# Patient Record
Sex: FEMALE | Race: WHITE | NOT HISPANIC OR LATINO | Employment: FULL TIME | URBAN - METROPOLITAN AREA
[De-identification: names, ages, dates, MRNs, and addresses within clinical notes are randomized per-mention and may not be internally consistent; named-entity substitution may affect disease eponyms.]

---

## 2020-09-08 ENCOUNTER — TELEPHONE (OUTPATIENT)
Dept: INTERNAL MEDICINE | Facility: CLINIC | Age: 55
End: 2020-09-08

## 2020-09-08 DIAGNOSIS — R35.0 URINE FREQUENCY: Primary | ICD-10-CM

## 2020-09-08 RX ORDER — SULFAMETHOXAZOLE AND TRIMETHOPRIM 800; 160 MG/1; MG/1
1 TABLET ORAL 2 TIMES DAILY
Qty: 6 TABLET | Refills: 0 | Status: SHIPPED | OUTPATIENT
Start: 2020-09-08 | End: 2020-09-11

## 2020-09-08 RX ORDER — SULFAMETHOXAZOLE AND TRIMETHOPRIM 800; 160 MG/1; MG/1
1 TABLET ORAL 2 TIMES DAILY
COMMUNITY
Start: 2020-09-08 | End: 2020-09-08 | Stop reason: SDUPTHER

## 2020-09-08 NOTE — TELEPHONE ENCOUNTER
PT CALLED C/O DK YELL, CLOUDY AND STRONG ODOR, AND FREQUENCY.  ASKING FOR RX TO BE SENT FOR UTI, SHE STATES SHE IS UNABLE TO BE SEEN BECAUSE SHE IS UNDER  SELF QUARANTINE DUE TO SURGICAL PROCEDURE BEING DONE THUR 9/10/20 (REMOVE MELANOMA FROM ARM) . SHE SAID SHE HAD UTI BEFORE AND THIS IS THE EXACT SYMPTOMS SHE HAD BEFORE WHEN YOU PRESCRIBED ABX .   I ADVISED HER YOU NORMALLY WOULD NOT DO THIS WITHOUT BEING SEEN OR AT LEAST DROPPING OFF SPECIMEN BUT SHE ASKED IF YOU MAKE EXCEPTION SINCE SHE IS UNABLE TO COME IN.  NKDA

## 2020-10-08 RX ORDER — GUAIFENESIN AND PSEUDOEPHEDRINE HYDROCHLORIDE 600; 60 MG/1; MG/1
TABLET, EXTENDED RELEASE ORAL
Qty: 30 TABLET | Refills: 1 | Status: SHIPPED | OUTPATIENT
Start: 2020-10-08 | End: 2020-12-03

## 2020-10-21 RX ORDER — LEVOTHYROXINE SODIUM 0.1 MG/1
TABLET ORAL
Qty: 30 TABLET | Refills: 1 | Status: SHIPPED | OUTPATIENT
Start: 2020-10-21 | End: 2020-12-28 | Stop reason: SDUPTHER

## 2020-12-03 RX ORDER — GUAIFENESIN AND PSEUDOEPHEDRINE HYDROCHLORIDE 600; 60 MG/1; MG/1
TABLET, EXTENDED RELEASE ORAL
Qty: 90 TABLET | Refills: 0 | Status: SHIPPED | OUTPATIENT
Start: 2020-12-03

## 2020-12-23 RX ORDER — LEVOTHYROXINE SODIUM 0.1 MG/1
TABLET ORAL
Qty: 30 TABLET | Refills: 0 | OUTPATIENT
Start: 2020-12-23

## 2020-12-28 RX ORDER — GUAIFENESIN, PSEUDOEPHEDRINE HYDROCHLORIDE 600; 60 MG/1; MG/1
1 TABLET, EXTENDED RELEASE ORAL DAILY
Qty: 90 TABLET | Refills: 0 | Status: CANCELLED | OUTPATIENT
Start: 2020-12-28

## 2020-12-28 RX ORDER — LEVOTHYROXINE SODIUM 0.1 MG/1
100 TABLET ORAL DAILY
Qty: 30 TABLET | Refills: 0 | Status: SHIPPED | OUTPATIENT
Start: 2020-12-28

## 2020-12-28 NOTE — TELEPHONE ENCOUNTER
Caller: Ingrid London    Relationship: Self    Best call back number:  Phone/Fax            12/28/2020 02:57 PM Phone (Incoming) Ingrid London (Self) [phone]          Medication needed:   Requested Prescriptions     Pending Prescriptions Disp Refills   • levothyroxine (SYNTHROID, LEVOTHROID) 100 MCG tablet 30 tablet 1     Sig: Take 1 tablet by mouth Daily.   • pseudoephedrine-guaifenesin (Mucinex D)  MG per 12 hr tablet 90 tablet 0     Sig: Take 1 tablet by mouth Daily.       When do you need the refill by: 01/01/21  What details did the patient provide when requesting the medication:  OFFBOARDING DR JUAN, NEW PATIENT [facility]      Does the patient have less than a 3 day supply:  [] Yes  [x] No    What is the patient's preferred pharmacy: [facility] [address]  AT [facility] [phone] [facility] [phone]

## 2024-03-28 ENCOUNTER — OFFICE VISIT (OUTPATIENT)
Dept: SLEEP MEDICINE | Facility: HOSPITAL | Age: 59
End: 2024-03-28
Payer: COMMERCIAL

## 2024-03-28 VITALS
BODY MASS INDEX: 40.84 KG/M2 | HEART RATE: 106 BPM | OXYGEN SATURATION: 96 % | WEIGHT: 239.2 LBS | SYSTOLIC BLOOD PRESSURE: 134 MMHG | HEIGHT: 64 IN | DIASTOLIC BLOOD PRESSURE: 75 MMHG

## 2024-03-28 DIAGNOSIS — G47.19 EXCESSIVE DAYTIME SLEEPINESS: ICD-10-CM

## 2024-03-28 DIAGNOSIS — R06.83 SNORING: ICD-10-CM

## 2024-03-28 DIAGNOSIS — E66.01 CLASS 3 SEVERE OBESITY DUE TO EXCESS CALORIES WITHOUT SERIOUS COMORBIDITY WITH BODY MASS INDEX (BMI) OF 40.0 TO 44.9 IN ADULT: ICD-10-CM

## 2024-03-28 DIAGNOSIS — F51.04 CHRONIC INSOMNIA: ICD-10-CM

## 2024-03-28 DIAGNOSIS — G47.30 OBSERVED SLEEP APNEA: Primary | ICD-10-CM

## 2024-03-28 DIAGNOSIS — G47.8 NON-RESTORATIVE SLEEP: ICD-10-CM

## 2024-03-28 PROBLEM — E66.813 CLASS 3 SEVERE OBESITY IN ADULT: Status: ACTIVE | Noted: 2024-03-28

## 2024-03-28 PROCEDURE — G0463 HOSPITAL OUTPT CLINIC VISIT: HCPCS

## 2024-03-28 PROCEDURE — 99204 OFFICE O/P NEW MOD 45 MIN: CPT | Performed by: INTERNAL MEDICINE

## 2024-03-28 NOTE — PROGRESS NOTES
[facility]  [address]  [address]  [place] [address]  Phone   Fax       Ingrid London  [id number]   1965  58 y.o.  female      PCP:Provider, No Known    Type of service: Initial New Patient Office Visit  Date of service: 3/28/2024    Chief Complaint   Patient presents with    Witnessed Apnea    Snoring    Non-restorative Sleep    Fatigue    Dry Mouth    Daytime Sleepiness    Insomnia    Obesity       History of present illness;  Ingrid London 58 y.o.  is a new patient for me and was seen today for sleep related problems of snoring, non-restorative sleep and witnessed apneas. The symptoms are present for many years and they are persistent in nature.  The snoring is present in all positions and it is loud.  Patient has no prior surgery namely tonsillectomy, nasal surgery and UPPP.  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  She used to be in the bed all area now she has more to longterm plans.  She feels tired exhausted and not rested when she wakes up.    In addition she has chronic insomnia for which she is taking 300 mg of trazodone provided by her psychiatrist and also as needed Ambien 5 mg maybe 1 or 2 times a week.      Patient gives the following sleep history.  Sleep schedule:  Bedtime: 9 PM but usually takes 2 to 3 hours to fall asleep  Wake time: 7 AM  Normally takes about 2 to 3 hours to fall asleep unfortunately she watches television during that time  Average hours of sleep 6  Number of naps per day none  Symptoms  In addition to snoring, nonrestorative sleep and witnessed apneas patient gives the following associated symptoms.  Have you ever awakened gasping for breath, coughing, choking: Yes   Change in weight,  Yes gained 10 pounds  Morning headaches  Yes   Awaken with a sore throat or dry mouth  No   Leg jerking at night:  No   Crawly feeling/urge sensation to move in the legs: No   Teeth grinding:No   Have you ever awakened at night with a sour taste or  "burning sensation in your chest:  No   Do you have muscle weakness with laughing or anger or sleep paralysis:  No   Have you ever felt paralyzed while going to sleep or waking up:  No   Sleepwalking, nightmares, No   Nocturia (urination at night): 2 times per night  Memory Problem:No     Past medical history: (Relevant to sleep medicine)  Anxiety and depression  Migraine  Insomnia  Hypothyroidism    Medications are reviewed by me and documented in the encounter  Allergies reviewed and documented in encounter    Social history:  Do you drive a commercial vehicle:  No   Shift work:  No   Tobacco use:  No   Alcohol use:  1 per month  Caffeinated drinks: 2    FAMILY HISTORY (Your mother, father, brothers and sisters) (relevant to sleep medicine)  Thyroid disorder  Obesity    REVIEW OF SYSTEMS.  Full review of systems available on the intake form which is scanned in the media tab.  The relevant positive are noted below  Daytime excessive sleepiness with [place] Sleepiness Scale :Total score: 10   Snoring  Frequent urination  Heartburn  Postnasal drip      Physical exam:  Vitals:    03/28/24 1100   BP: 134/75   Pulse: 106   SpO2: 96%   Weight: 109 kg (239 lb 3.2 oz)   Height: 162.6 cm (64\")    Body mass index is 41.06 kg/m². Neck Circumference: 14.5 inches  Nose: no nasal septal defects or deviation and the nasal passages are clear, no nasal polyps,  Throat: tonsils are at enlarged, tongue normal, oral airway Mallampati class 3  NECK:Neck Circumference: 14.5 inches, trachea is in the midline, thyroid not enlarged  RESPIRATORY SYSTEM: Breath sounds are equal on both sides, there are no wheezes   CARDIOVASULAR SYSTEM: Heart sounds are regular rhythm and liliana rate, no edema  EXTREMITES: No cyanosis, clubbing  NEUROLOGICAL SYSTEM: Oriented x 3, no gross motor defects, gait normal        Assessment and plan:  Witnessed apnea (R06.81) patient's symptoms and examination is consistent with sleep apnea (G47.30)  I have talked to " the patient about the signs and symptoms of sleep apnea. In addition, I have also discussed pathophysiology of sleep apnea.  I also discussed the complications of untreated sleep apnea including effects on hypertension, diabetes mellitus and nonrestorative sleep with hypersomnia which can increase risk for motor vehicle accidents.  Untreated sleep apnea is also a risk factor for development of atrial fibrillation, pulmonary hypertension, insulin resistance and stroke.  Discussed in detail of various testing methods including home-based and lab based sleep studies.  Based on history and physical examination the most appropriate study is home sleep test.  The order for the sleep study is placed in [facility].  The test will be scheduled after approval from insurance. Treatment and management will be discussed after the test is completed.  Patient was given opportunity to ask questions and all the questions were answered.   Snoring (R06.83) snoring is the sound created by turbulent airflow vibrating upper airway soft tissue.  I have also discussed factors affecting snoring including sleep deprivation, sleeping on the back and alcohol ingestion. To minimize snoring, patient is advised to have adequate sleep, sleep on the side and avoid alcohol and sedative medications before bedtime  Daytime excessive sleepiness .  It was assessed with [place] Sleepiness Scale of Total score: 10.  There are many causes for daytime excessive sleepiness including sleep depression, shiftwork syndrome, depression and other medical disorders including heart, kidney and liver failure.  The most serious cause of excessive sleepiness is due to neurological conditions like narcolepsy/cataplexy.  But the most common cause of excessive sleepiness is due to sleep apnea with frequent awakenings during sleep time.  I have discussed safety of driving and to remain vigilant while driving.  Obesity 3, with BMI Body mass index is 41.06 kg/m².. I have discussed  the relationship between weight and sleep apnea.There is direct correlation between weight and severity of sleep apnea.  Weight reduction is encouraged, as it is going to reduce the severity of sleep apnea. I have also discussed with the patient diet and exercise to achieve ideal body weight  Chronic insomnia, patient needs to wean off trazodone.  Trazodone has shown no benefit and insomnia.  Current AASM guidelines does not recommend trazodone.  Meantime she can use Ambien only on a short-term basis but she is going to start practicing CBT-I (cognitive behavoral therapy).  I have also given instructions for stimulus control.  She can download thania an good sleep hygiene along with stimulus control.  She is spending too much time in bed.    I have also discussed with the patient the following  Sleep hygiene: Maintaining a regular bedtime and wake time, not to watch television or work in bed, limit caffeine-containing beverages before bed time and avoid naps during the day  Adequate amount of sleep.  Generally most people needs about 7 to 8 hours of sleep.  Return for 31 to 90 days after PAP setup with down load..  Patient's questions were answered.      3/28/2024  Cecilia Taylor MD  Sleep Medicine  Medical Director  [facility]: [place] and [place] sleep centers

## 2025-03-28 ENCOUNTER — OFFICE (AMBULATORY)
Dept: URBAN - METROPOLITAN AREA PATHOLOGY 4 | Facility: PATHOLOGY | Age: 60
End: 2025-03-28
Payer: COMMERCIAL

## 2025-03-28 ENCOUNTER — AMBULATORY SURGICAL CENTER (AMBULATORY)
Dept: URBAN - METROPOLITAN AREA SURGERY 17 | Facility: SURGERY | Age: 60
End: 2025-03-28
Payer: COMMERCIAL

## 2025-03-28 VITALS
SYSTOLIC BLOOD PRESSURE: 127 MMHG | TEMPERATURE: 97.7 F | DIASTOLIC BLOOD PRESSURE: 92 MMHG | HEART RATE: 86 BPM | RESPIRATION RATE: 24 BRPM | OXYGEN SATURATION: 94 % | SYSTOLIC BLOOD PRESSURE: 125 MMHG | DIASTOLIC BLOOD PRESSURE: 107 MMHG | RESPIRATION RATE: 11 BRPM | SYSTOLIC BLOOD PRESSURE: 132 MMHG | RESPIRATION RATE: 10 BRPM | SYSTOLIC BLOOD PRESSURE: 117 MMHG | RESPIRATION RATE: 16 BRPM | WEIGHT: 170 LBS | SYSTOLIC BLOOD PRESSURE: 144 MMHG | HEART RATE: 84 BPM | DIASTOLIC BLOOD PRESSURE: 69 MMHG | SYSTOLIC BLOOD PRESSURE: 116 MMHG | OXYGEN SATURATION: 91 % | HEART RATE: 102 BPM | RESPIRATION RATE: 18 BRPM | SYSTOLIC BLOOD PRESSURE: 122 MMHG | OXYGEN SATURATION: 95 % | DIASTOLIC BLOOD PRESSURE: 73 MMHG | SYSTOLIC BLOOD PRESSURE: 110 MMHG | DIASTOLIC BLOOD PRESSURE: 71 MMHG | TEMPERATURE: 97.2 F | HEART RATE: 82 BPM | SYSTOLIC BLOOD PRESSURE: 129 MMHG | SYSTOLIC BLOOD PRESSURE: 111 MMHG | HEART RATE: 92 BPM | RESPIRATION RATE: 19 BRPM | HEIGHT: 64 IN | OXYGEN SATURATION: 97 % | SYSTOLIC BLOOD PRESSURE: 121 MMHG | OXYGEN SATURATION: 98 % | DIASTOLIC BLOOD PRESSURE: 68 MMHG | DIASTOLIC BLOOD PRESSURE: 76 MMHG | DIASTOLIC BLOOD PRESSURE: 75 MMHG | SYSTOLIC BLOOD PRESSURE: 118 MMHG | RESPIRATION RATE: 20 BRPM | RESPIRATION RATE: 22 BRPM | HEART RATE: 85 BPM | DIASTOLIC BLOOD PRESSURE: 83 MMHG | DIASTOLIC BLOOD PRESSURE: 77 MMHG | SYSTOLIC BLOOD PRESSURE: 112 MMHG | DIASTOLIC BLOOD PRESSURE: 88 MMHG | HEART RATE: 83 BPM | RESPIRATION RATE: 21 BRPM

## 2025-03-28 DIAGNOSIS — D12.3 BENIGN NEOPLASM OF TRANSVERSE COLON: ICD-10-CM

## 2025-03-28 DIAGNOSIS — K57.10 DIVERTICULOSIS OF SMALL INTESTINE WITHOUT PERFORATION OR ABS: ICD-10-CM

## 2025-03-28 DIAGNOSIS — D12.0 BENIGN NEOPLASM OF CECUM: ICD-10-CM

## 2025-03-28 DIAGNOSIS — K31.89 OTHER DISEASES OF STOMACH AND DUODENUM: ICD-10-CM

## 2025-03-28 DIAGNOSIS — D12.2 BENIGN NEOPLASM OF ASCENDING COLON: ICD-10-CM

## 2025-03-28 DIAGNOSIS — K22.70 BARRETT'S ESOPHAGUS WITHOUT DYSPLASIA: ICD-10-CM

## 2025-03-28 DIAGNOSIS — K57.30 DIVERTICULOSIS OF LARGE INTESTINE WITHOUT PERFORATION OR ABS: ICD-10-CM

## 2025-03-28 DIAGNOSIS — R12 HEARTBURN: ICD-10-CM

## 2025-03-28 DIAGNOSIS — Z12.11 ENCOUNTER FOR SCREENING FOR MALIGNANT NEOPLASM OF COLON: ICD-10-CM

## 2025-03-28 DIAGNOSIS — Z80.0 FAMILY HISTORY OF MALIGNANT NEOPLASM OF DIGESTIVE ORGANS: ICD-10-CM

## 2025-03-28 DIAGNOSIS — K29.71 GASTRITIS, UNSPECIFIED, WITH BLEEDING: ICD-10-CM

## 2025-03-28 PROBLEM — K63.5 POLYP OF COLON: Status: ACTIVE | Noted: 2025-03-28

## 2025-03-28 PROBLEM — K22.89 OTHER SPECIFIED DISEASE OF ESOPHAGUS: Status: ACTIVE | Noted: 2025-03-28

## 2025-03-28 LAB
GI HISTOLOGY: A. STOMACH ANTRUM: (no result)
GI HISTOLOGY: B. GASTRO-ESOPHAGEAL JUNCTION: (no result)
GI HISTOLOGY: D. HEPATIC FLEXURE/POLYP: (no result)
GI HISTOLOGY: PDF REPORT: (no result)
Lab: (no result)

## 2025-03-28 PROCEDURE — 43239 EGD BIOPSY SINGLE/MULTIPLE: CPT | Performed by: INTERNAL MEDICINE

## 2025-03-28 PROCEDURE — 88342 IMHCHEM/IMCYTCHM 1ST ANTB: CPT | Performed by: PATHOLOGY

## 2025-03-28 PROCEDURE — 45380 COLONOSCOPY AND BIOPSY: CPT | Mod: 33,59 | Performed by: INTERNAL MEDICINE

## 2025-03-28 PROCEDURE — 88305 TISSUE EXAM BY PATHOLOGIST: CPT | Performed by: PATHOLOGY

## 2025-03-28 PROCEDURE — 45385 COLONOSCOPY W/LESION REMOVAL: CPT | Mod: 33 | Performed by: INTERNAL MEDICINE

## 2025-05-21 ENCOUNTER — LAB (OUTPATIENT)
Dept: OTHER | Facility: HOSPITAL | Age: 60
End: 2025-05-21
Payer: COMMERCIAL

## 2025-05-21 ENCOUNTER — CONSULT (OUTPATIENT)
Dept: ONCOLOGY | Facility: CLINIC | Age: 60
End: 2025-05-21
Payer: COMMERCIAL

## 2025-05-21 VITALS
TEMPERATURE: 97.5 F | RESPIRATION RATE: 17 BRPM | BODY MASS INDEX: 29.52 KG/M2 | WEIGHT: 166.6 LBS | DIASTOLIC BLOOD PRESSURE: 78 MMHG | OXYGEN SATURATION: 97 % | SYSTOLIC BLOOD PRESSURE: 114 MMHG | HEIGHT: 63 IN | HEART RATE: 82 BPM

## 2025-05-21 DIAGNOSIS — Z85.820 HISTORY OF MELANOMA: ICD-10-CM

## 2025-05-21 DIAGNOSIS — D75.89 MACROCYTOSIS: ICD-10-CM

## 2025-05-21 DIAGNOSIS — D72.820 LYMPHOCYTOSIS: ICD-10-CM

## 2025-05-21 DIAGNOSIS — D72.820 LYMPHOCYTOSIS: Primary | ICD-10-CM

## 2025-05-21 DIAGNOSIS — R89.9 ABNORMAL LABORATORY TEST: Primary | ICD-10-CM

## 2025-05-21 DIAGNOSIS — R11.0 INTRACTABLE NAUSEA: ICD-10-CM

## 2025-05-21 DIAGNOSIS — R53.82 CHRONIC FATIGUE: ICD-10-CM

## 2025-05-21 LAB
ALBUMIN SERPL-MCNC: 4.4 G/DL (ref 3.5–5.2)
ALBUMIN/GLOB SERPL: 2 G/DL
ALP SERPL-CCNC: 75 U/L (ref 39–117)
ALT SERPL W P-5'-P-CCNC: 8 U/L (ref 1–33)
ANION GAP SERPL CALCULATED.3IONS-SCNC: 11.9 MMOL/L (ref 5–15)
AST SERPL-CCNC: 15 U/L (ref 1–32)
B2 MICROGLOB SERPL-MCNC: 2.9 MG/L (ref 0.8–2.2)
BASOPHILS # BLD AUTO: 0.06 10*3/MM3 (ref 0–0.2)
BASOPHILS NFR BLD AUTO: 0.6 % (ref 0–1.5)
BILIRUB SERPL-MCNC: 0.4 MG/DL (ref 0–1.2)
BUN SERPL-MCNC: 6 MG/DL (ref 8–23)
BUN/CREAT SERPL: 5.7 (ref 7–25)
CALCIUM SPEC-SCNC: 9.7 MG/DL (ref 8.6–10.5)
CHLORIDE SERPL-SCNC: 102 MMOL/L (ref 98–107)
CO2 SERPL-SCNC: 25.1 MMOL/L (ref 22–29)
CREAT SERPL-MCNC: 1.06 MG/DL (ref 0.57–1)
DEPRECATED RDW RBC AUTO: 45.7 FL (ref 37–54)
EGFRCR SERPLBLD CKD-EPI 2021: 59.9 ML/MIN/1.73
EOSINOPHIL # BLD AUTO: 0.07 10*3/MM3 (ref 0–0.4)
EOSINOPHIL NFR BLD AUTO: 0.7 % (ref 0.3–6.2)
ERYTHROCYTE [DISTWIDTH] IN BLOOD BY AUTOMATED COUNT: 12.5 % (ref 12.3–15.4)
FOLATE SERPL-MCNC: 5.92 NG/ML (ref 4.78–24.2)
GLOBULIN UR ELPH-MCNC: 2.2 GM/DL
GLUCOSE SERPL-MCNC: 89 MG/DL (ref 65–99)
HCT VFR BLD AUTO: 46.5 % (ref 34–46.6)
HGB BLD-MCNC: 15.4 G/DL (ref 12–15.9)
IMM GRANULOCYTES # BLD AUTO: 0.05 10*3/MM3 (ref 0–0.05)
IMM GRANULOCYTES NFR BLD AUTO: 0.5 % (ref 0–0.5)
LDH SERPL-CCNC: 133 U/L (ref 135–214)
LYMPHOCYTES # BLD AUTO: 1.62 10*3/MM3 (ref 0.7–3.1)
LYMPHOCYTES NFR BLD AUTO: 16.9 % (ref 19.6–45.3)
MCH RBC QN AUTO: 33.1 PG (ref 26.6–33)
MCHC RBC AUTO-ENTMCNC: 33.1 G/DL (ref 31.5–35.7)
MCV RBC AUTO: 100 FL (ref 79–97)
MONOCYTES # BLD AUTO: 0.62 10*3/MM3 (ref 0.1–0.9)
MONOCYTES NFR BLD AUTO: 6.5 % (ref 5–12)
NEUTROPHILS NFR BLD AUTO: 7.14 10*3/MM3 (ref 1.7–7)
NEUTROPHILS NFR BLD AUTO: 74.8 % (ref 42.7–76)
NRBC BLD AUTO-RTO: 0 /100 WBC (ref 0–0.2)
PLATELET # BLD AUTO: 404 10*3/MM3 (ref 140–450)
PMV BLD AUTO: 8.4 FL (ref 6–12)
POTASSIUM SERPL-SCNC: 4 MMOL/L (ref 3.5–5.2)
PROT SERPL-MCNC: 6.6 G/DL (ref 6–8.5)
RBC # BLD AUTO: 4.65 10*6/MM3 (ref 3.77–5.28)
SODIUM SERPL-SCNC: 139 MMOL/L (ref 136–145)
VIT B12 BLD-MCNC: >2000 PG/ML (ref 211–946)
WBC NRBC COR # BLD AUTO: 9.56 10*3/MM3 (ref 3.4–10.8)

## 2025-05-21 PROCEDURE — 85025 COMPLETE CBC W/AUTO DIFF WBC: CPT | Performed by: INTERNAL MEDICINE

## 2025-05-21 PROCEDURE — 82232 ASSAY OF BETA-2 PROTEIN: CPT | Performed by: INTERNAL MEDICINE

## 2025-05-21 PROCEDURE — 36415 COLL VENOUS BLD VENIPUNCTURE: CPT

## 2025-05-21 PROCEDURE — 83521 IG LIGHT CHAINS FREE EACH: CPT | Performed by: INTERNAL MEDICINE

## 2025-05-21 PROCEDURE — 80053 COMPREHEN METABOLIC PANEL: CPT | Performed by: INTERNAL MEDICINE

## 2025-05-21 PROCEDURE — 84165 PROTEIN E-PHORESIS SERUM: CPT | Performed by: INTERNAL MEDICINE

## 2025-05-21 PROCEDURE — 83921 ORGANIC ACID SINGLE QUANT: CPT | Performed by: INTERNAL MEDICINE

## 2025-05-21 PROCEDURE — 82784 ASSAY IGA/IGD/IGG/IGM EACH: CPT | Performed by: INTERNAL MEDICINE

## 2025-05-21 PROCEDURE — 83615 LACTATE (LD) (LDH) ENZYME: CPT | Performed by: INTERNAL MEDICINE

## 2025-05-21 PROCEDURE — 86334 IMMUNOFIX E-PHORESIS SERUM: CPT | Performed by: INTERNAL MEDICINE

## 2025-05-21 PROCEDURE — 82746 ASSAY OF FOLIC ACID SERUM: CPT | Performed by: INTERNAL MEDICINE

## 2025-05-21 PROCEDURE — 82607 VITAMIN B-12: CPT | Performed by: INTERNAL MEDICINE

## 2025-05-21 RX ORDER — ERENUMAB-AOOE 140 MG/ML
140 INJECTION, SOLUTION SUBCUTANEOUS
COMMUNITY
Start: 2024-08-26 | End: 2025-08-26

## 2025-05-21 RX ORDER — GALCANEZUMAB 120 MG/ML
120 INJECTION, SOLUTION SUBCUTANEOUS ONCE
COMMUNITY

## 2025-05-21 RX ORDER — TRAZODONE HYDROCHLORIDE 50 MG/1
50 TABLET ORAL NIGHTLY
COMMUNITY
Start: 2020-05-20

## 2025-05-21 NOTE — LETTER
May 21, 2025     Linda Myers MD  [address] [phone]    Patient: Ingrid London   YOB: 1965   Date of Visit: 5/21/2025     Dear Linda Myers MD:       Thank you for referring Ingrid London to me for evaluation. Below are the relevant portions of my assessment and plan of care.    If you have questions, please do not hesitate to call me. I look forward to following Ingrid along with you.         Sincerely,        Savannah Brady MD        CC: No Recipients    Savannah Brady MD  05/21/25 1917  Sign when Signing Visit  Subjective    REASON FOR CONSULTATION:  Provide an opinion on any further workup or treatment on:    Lymphocytosis                       REQUESTING PHYSICIAN: Linda Myers MD    RECORDS OBTAINED: Records of the patients history including those obtained from the referring provider were reviewed and summarized in detail.    HISTORY OF PRESENT ILLNESS:    Ingrid London is a 59 y.o. patient who was referred for evaluation of lymphocytosis.  Patient was at her usual state of health until January 2025.  She went on a trip to [place].  She developed recurrent diarrhea and nausea despite her measures to avoid the drinking tap water and eating fresh fruits/vegetables.  2 studies showed that she developed norovirus, astrovirus and E. coli.  She was treated conservatively and was drinking good amount of fluids to rehydrate.  She was seen by GI and underwent EGD and colonoscopy.  She was planned to have Conteh's esophagus.  She was placed on pantoprazole and she is taking this regularly.  The diarrhea has resolved.  However, she reports having recurrent nausea.  It is occurring about every other day.    Patient was evaluated by immunology.  She was found to have low antibody levels.  She had peripheral blood flow cytometry that reported the presence of a monoclonal B-cell population.  She was referred to our office for further evaluation.    Patient has history of  "melanomas involving the left upper extremity.  1 was in situ and did not require extensive surgical resection.  The second lesion which is in the proximal left forearm was treated with wide local excision and axillary sentinel lymph node biopsy.  She did not require adjuvant therapy.      Patient reports fatigue.     REVIEW OF SYSTEMS:  Pertinent ROS as in the HPI.     Past Medical History:   Diagnosis Date   • Depression    • Herpes    • Hyperlipidemia    • Hypothyroidism    • Melanoma    • Migraines      Past Surgical History:   Procedure Laterality Date   • CHOLECYSTECTOMY       Social History     Socioeconomic History   • Marital status: Single   Tobacco Use   • Smoking status: Never   • Smokeless tobacco: Never   Substance and Sexual Activity   • Alcohol use: Yes     No family history on file.     MEDICATIONS:    Current Outpatient Medications:   •  Erenumab-aooe (Aimovig) 140 MG/ML auto-injector, Inject 1 mL under the skin into the appropriate area as directed., Disp: , Rfl:   •  galcanezumab-gnlm (Emgality) 120 MG/ML auto-injector pen, Inject 1 mL under the skin into the appropriate area as directed 1 (One) Time., Disp: , Rfl:   •  levothyroxine (SYNTHROID, LEVOTHROID) 100 MCG tablet, Take 1 tablet by mouth Daily., Disp: 30 tablet, Rfl: 0  •  Mucinex D  MG per 12 hr tablet, TAKE ONE TABLET BY MOUTH DAILY, Disp: 90 tablet, Rfl: 0  •  traZODone (DESYREL) 50 MG tablet, Take 1 tablet by mouth Every Night., Disp: , Rfl:      ALLERGIES:  No Known Allergies     Objective  VITAL SIGNS:  Vitals:    05/21/25 0945   BP: 114/78   Pulse: 82   Resp: 17   Temp: 97.5 °F (36.4 °C)   TempSrc: Oral   SpO2: 97%   Weight: 75.6 kg (166 lb 9.6 oz)   Height: 160 cm (63\")   PainSc: 0-No pain       Wt Readings from Last 3 Encounters:   05/21/25 75.6 kg (166 lb 9.6 oz)   03/28/24 109 kg (239 lb 3.2 oz)     PHYSICAL EXAMINATION  GENERAL:  The patient appears in good general condition, not in acute distress.  SKIN: No skin rashes. " "Surgical scar over the left forearm proximal aspect.   HEAD:  Normocephalic.  EYES:  No Jaundice. No Pallor. Pupils equal. EOMI.  NECK:  Supple with Good ROM. No Masses.  LYMPHATICS:  No cervical or supraclavicular or axillary lymphadenopathy.  CHEST: Normal respiratory effort.   ABDOMEN:  Soft. No tenderness. No Hepatomegaly. No Splenomegaly. No masses.  EXTREMITIES:  No noted deformity.   NEUROLOGICAL:  No Focal neurological deficits.     RESULT REVIEW:   Results from last 7 days   Lab Units 05/21/25  0938   WBC 10*3/mm3 9.56   NEUTROS ABS 10*3/mm3 7.14*   HEMOGLOBIN g/dL 15.4   HEMATOCRIT % 46.5   PLATELETS 10*3/mm3 404     Results from last 7 days   Lab Units 05/21/25  0938   SODIUM mmol/L 139   POTASSIUM mmol/L 4.0   CHLORIDE mmol/L 102   CO2 mmol/L 25.1   BUN mg/dL 6*   CREATININE mg/dL 1.06*   CALCIUM mg/dL 9.7   ALBUMIN g/dL 4.4   BILIRUBIN mg/dL 0.4   ALK PHOS U/L 75   ALT (SGPT) U/L 8   AST (SGOT) U/L 15     Flow cytometry on 4/7/2025:  LEUKEMIA / LYMPHOMA PANEL INTERPRETATION:  (BLOOD)     IMMUNOPHENOTYPING IDENTIFIES AN ATYPICAL KAPPA LIGHT CHAIN PREDOMINANT B CELL POPULATION REPRESENTING 5.6% OF TOTAL EVENTS (SEE COMMENT).    The immunophenotypic findings are not specific.      Similar populations may be seen in reactive processes, including viral infection or in patients with autoimmune disorders or immunosuppression (particularly if EBV driven). The possibility of involvement by a B cell lymphoproliferative disorder including malignant lymphoma cannot be excluded. The differential diagnosis also includes a \"monoclonal B cell lymphocytosis (non-CLL-like)\".     Additional B cell gene rearrangement and/or SPEP/ODILON studies may be helpful in further evaluating for the presence of a clonal population.     If a clonal population is detected, further evaluation to exclude the possibility of a B cell lymphoma may be indicated in this patient. Otherwise, suggest continued clinical follow up with repeat study " after an appropriate clinical interval to better assess the significance of these findings.     Assessment & Plan  *Monoclonal B-cell lymphocytosis.  Flow cytometry on 4/7/2025 revealed atypical kappa light chain predominant B-cell population representing 5.6% of the cells.  The features were non-CLL like.  Total lymphocyte count on 4/7/2025 was 2220.   5/21/2025: Lymphocyte count 1620.  Exam revealed no palpable lymphadenopathy or splenomegaly.  ?  Secondary to infections she developed in January 2025-norovirus, astrovirus and E. Coli.  She was found to have low IgG IgM and IgA levels-?  Reactive decrease in the levels secondary to the infections in January 2025.    *Macrocytosis.  5/21/2025: .  Hemoglobin 15.4.  Hematocrit 46.5.  Patient reports significant fatigue and lethargy.  She does not have underlying liver disease.  LFTs are normal.  She does not drink alcohol regularly.  She has hypothyroidism and is on replacement.  Labs today revealed vitamin B12 >2000.  Folate is low normal at 5.92.    *Thrombocytosis.  4/7/2025: Platelet count increased to 456,000.  5/21/2025: Platelet count improved to 404,000.    *Intractable nausea.  Reports having nausea almost every other day.  She was found to have Conteh's esophagus on EGD in April 2025.  She is on pantoprazole 40 mg daily.    *History of melanoma.  She has history of melanoma involving the left forearm.  S/p mild local excision + axillary sentinel lymph node biopsy by Dr. Radha Villegas.  She did not require adjuvant therapy.    PLAN:    1.  Obtain CT scan of the abdomen pelvis.  2.  Obtain SPEP ODILON FLC and beta-2 microglobulin.  3.  Start folic acid 1 mg daily.  4.  We will see her in follow-up in 1 month.  CBC B2M will be obtained.      Savannah Brady MD  05/21/25

## 2025-05-21 NOTE — PROGRESS NOTES
Subjective     REASON FOR CONSULTATION:  Provide an opinion on any further workup or treatment on:    Lymphocytosis                       REQUESTING PHYSICIAN: Linda Myers MD    RECORDS OBTAINED: Records of the patients history including those obtained from the referring provider were reviewed and summarized in detail.    HISTORY OF PRESENT ILLNESS:    Ingrid London is a 59 y.o. patient who was referred for evaluation of lymphocytosis.  Patient was at her usual state of health until January 2025.  She went on a trip to [place].  She developed recurrent diarrhea and nausea despite her measures to avoid the drinking tap water and eating fresh fruits/vegetables.  2 studies showed that she developed norovirus, astrovirus and E. coli.  She was treated conservatively and was drinking good amount of fluids to rehydrate.  She was seen by GI and underwent EGD and colonoscopy.  She was planned to have Conteh's esophagus.  She was placed on pantoprazole and she is taking this regularly.  The diarrhea has resolved.  However, she reports having recurrent nausea.  It is occurring about every other day.    Patient was evaluated by immunology.  She was found to have low antibody levels.  She had peripheral blood flow cytometry that reported the presence of a monoclonal B-cell population.  She was referred to our office for further evaluation.    Patient has history of melanomas involving the left upper extremity.  1 was in situ and did not require extensive surgical resection.  The second lesion which is in the proximal left forearm was treated with wide local excision and axillary sentinel lymph node biopsy.  She did not require adjuvant therapy.      Patient reports fatigue.     REVIEW OF SYSTEMS:  Pertinent ROS as in the HPI.     Past Medical History:   Diagnosis Date    Depression     Herpes     Hyperlipidemia     Hypothyroidism     Melanoma     Migraines      Past Surgical History:   Procedure Laterality Date     "CHOLECYSTECTOMY       Social History     Socioeconomic History    Marital status: Single   Tobacco Use    Smoking status: Never    Smokeless tobacco: Never   Substance and Sexual Activity    Alcohol use: Yes     No family history on file.     MEDICATIONS:    Current Outpatient Medications:     Erenumab-aooe (Aimovig) 140 MG/ML auto-injector, Inject 1 mL under the skin into the appropriate area as directed., Disp: , Rfl:     galcanezumab-gnlm (Emgality) 120 MG/ML auto-injector pen, Inject 1 mL under the skin into the appropriate area as directed 1 (One) Time., Disp: , Rfl:     levothyroxine (SYNTHROID, LEVOTHROID) 100 MCG tablet, Take 1 tablet by mouth Daily., Disp: 30 tablet, Rfl: 0    Mucinex D  MG per 12 hr tablet, TAKE ONE TABLET BY MOUTH DAILY, Disp: 90 tablet, Rfl: 0    traZODone (DESYREL) 50 MG tablet, Take 1 tablet by mouth Every Night., Disp: , Rfl:      ALLERGIES:  No Known Allergies     Objective   VITAL SIGNS:  Vitals:    05/21/25 0945   BP: 114/78   Pulse: 82   Resp: 17   Temp: 97.5 °F (36.4 °C)   TempSrc: Oral   SpO2: 97%   Weight: 75.6 kg (166 lb 9.6 oz)   Height: 160 cm (63\")   PainSc: 0-No pain       Wt Readings from Last 3 Encounters:   05/21/25 75.6 kg (166 lb 9.6 oz)   03/28/24 109 kg (239 lb 3.2 oz)     PHYSICAL EXAMINATION  GENERAL:  The patient appears in good general condition, not in acute distress.  SKIN: No skin rashes. Surgical scar over the left forearm proximal aspect.   HEAD:  Normocephalic.  EYES:  No Jaundice. No Pallor. Pupils equal. EOMI.  NECK:  Supple with Good ROM. No Masses.  LYMPHATICS:  No cervical or supraclavicular or axillary lymphadenopathy.  CHEST: Normal respiratory effort.   ABDOMEN:  Soft. No tenderness. No Hepatomegaly. No Splenomegaly. No masses.  EXTREMITIES:  No noted deformity.   NEUROLOGICAL:  No Focal neurological deficits.     RESULT REVIEW:   Results from last 7 days   Lab Units 05/21/25  0938   WBC 10*3/mm3 9.56   NEUTROS ABS 10*3/mm3 7.14*   HEMOGLOBIN " "g/dL 15.4   HEMATOCRIT % 46.5   PLATELETS 10*3/mm3 404     Results from last 7 days   Lab Units 05/21/25  0938   SODIUM mmol/L 139   POTASSIUM mmol/L 4.0   CHLORIDE mmol/L 102   CO2 mmol/L 25.1   BUN mg/dL 6*   CREATININE mg/dL 1.06*   CALCIUM mg/dL 9.7   ALBUMIN g/dL 4.4   BILIRUBIN mg/dL 0.4   ALK PHOS U/L 75   ALT (SGPT) U/L 8   AST (SGOT) U/L 15     Flow cytometry on 4/7/2025:  LEUKEMIA / LYMPHOMA PANEL INTERPRETATION:  (BLOOD)     IMMUNOPHENOTYPING IDENTIFIES AN ATYPICAL KAPPA LIGHT CHAIN PREDOMINANT B CELL POPULATION REPRESENTING 5.6% OF TOTAL EVENTS (SEE COMMENT).    The immunophenotypic findings are not specific.      Similar populations may be seen in reactive processes, including viral infection or in patients with autoimmune disorders or immunosuppression (particularly if EBV driven). The possibility of involvement by a B cell lymphoproliferative disorder including malignant lymphoma cannot be excluded. The differential diagnosis also includes a \"monoclonal B cell lymphocytosis (non-CLL-like)\".     Additional B cell gene rearrangement and/or SPEP/ODILON studies may be helpful in further evaluating for the presence of a clonal population.     If a clonal population is detected, further evaluation to exclude the possibility of a B cell lymphoma may be indicated in this patient. Otherwise, suggest continued clinical follow up with repeat study after an appropriate clinical interval to better assess the significance of these findings.     Assessment & Plan   *Monoclonal B-cell lymphocytosis.  Flow cytometry on 4/7/2025 revealed atypical kappa light chain predominant B-cell population representing 5.6% of the cells.  The features were non-CLL like.  Total lymphocyte count on 4/7/2025 was 2220.   5/21/2025: Lymphocyte count 1620.  Exam revealed no palpable lymphadenopathy or splenomegaly.  ?  Secondary to infections she developed in January 2025-norovirus, astrovirus and E. Coli.  She was found to have low IgG IgM " and IgA levels-?  Reactive decrease in the levels secondary to the infections in January 2025.    *Macrocytosis.  5/21/2025: .  Hemoglobin 15.4.  Hematocrit 46.5.  Patient reports significant fatigue and lethargy.  She does not have underlying liver disease.  LFTs are normal.  She does not drink alcohol regularly.  She has hypothyroidism and is on replacement.  Labs today revealed vitamin B12 >2000.  Folate is low normal at 5.92.    *Thrombocytosis.  4/7/2025: Platelet count increased to 456,000.  5/21/2025: Platelet count improved to 404,000.    *Intractable nausea.  Reports having nausea almost every other day.  She was found to have Conteh's esophagus on EGD in April 2025.  She is on pantoprazole 40 mg daily.    *History of melanoma.  She has history of melanoma involving the left forearm.  S/p mild local excision + axillary sentinel lymph node biopsy by Dr. Radha Villegas.  She did not require adjuvant therapy.    PLAN:    1.  Obtain CT scan of the abdomen pelvis.  2.  Obtain SPEP ODILON FLC and beta-2 microglobulin.  3.  Start folic acid 1 mg daily.  4.  We will see her in follow-up in 1 month.  CBC B2M will be obtained.      Savannah Brady MD  05/21/25

## 2025-05-22 ENCOUNTER — TELEPHONE (OUTPATIENT)
Dept: ONCOLOGY | Facility: CLINIC | Age: 60
End: 2025-05-22
Payer: COMMERCIAL

## 2025-05-22 RX ORDER — FOLIC ACID 1 MG/1
1 TABLET ORAL DAILY
Qty: 90 TABLET | Refills: 1 | Status: SHIPPED | OUTPATIENT
Start: 2025-05-22

## 2025-05-22 NOTE — TELEPHONE ENCOUNTER
Reviewed Dr. Brady's note with the pt, she v/u.          ----- Message from Savannah Brady sent at 5/21/2025  7:14 PM EDT -----  Please inform the patient that labs showed folate level to be low normal.  I recommend starting folic acid 1 mg daily.Thank you

## 2025-05-23 LAB
ALBUMIN SERPL ELPH-MCNC: 3.6 G/DL (ref 2.9–4.4)
ALBUMIN/GLOB SERPL: 1.4 {RATIO} (ref 0.7–1.7)
ALPHA1 GLOB SERPL ELPH-MCNC: 0.3 G/DL (ref 0–0.4)
ALPHA2 GLOB SERPL ELPH-MCNC: 0.9 G/DL (ref 0.4–1)
B-GLOBULIN SERPL ELPH-MCNC: 0.9 G/DL (ref 0.7–1.3)
GAMMA GLOB SERPL ELPH-MCNC: 0.5 G/DL (ref 0.4–1.8)
GLOBULIN SER-MCNC: 2.6 G/DL (ref 2.2–3.9)
IGA SERPL-MCNC: 37 MG/DL (ref 87–352)
IGG SERPL-MCNC: 552 MG/DL (ref 586–1602)
IGM SERPL-MCNC: 21 MG/DL (ref 26–217)
INTERPRETATION SERPL IEP-IMP: ABNORMAL
KAPPA LC FREE SER-MCNC: 11.8 MG/L (ref 3.3–19.4)
KAPPA LC FREE/LAMBDA FREE SER: 1.18 {RATIO} (ref 0.26–1.65)
LABORATORY COMMENT REPORT: ABNORMAL
LAMBDA LC FREE SERPL-MCNC: 10 MG/L (ref 5.7–26.3)
M PROTEIN SERPL ELPH-MCNC: ABNORMAL G/DL
PROT SERPL-MCNC: 6.2 G/DL (ref 6–8.5)

## 2025-05-27 LAB — METHYLMALONATE SERPL-SCNC: 175 NMOL/L (ref 0–378)

## 2025-06-04 ENCOUNTER — HOSPITAL ENCOUNTER (OUTPATIENT)
Dept: CT IMAGING | Facility: HOSPITAL | Age: 60
Discharge: HOME | End: 2025-06-04
Admitting: INTERNAL MEDICINE
Payer: COMMERCIAL

## 2025-06-04 DIAGNOSIS — D72.820 LYMPHOCYTOSIS: ICD-10-CM

## 2025-06-04 DIAGNOSIS — R53.82 CHRONIC FATIGUE: ICD-10-CM

## 2025-06-04 DIAGNOSIS — R11.0 INTRACTABLE NAUSEA: ICD-10-CM

## 2025-06-04 DIAGNOSIS — D75.89 MACROCYTOSIS: ICD-10-CM

## 2025-06-04 PROCEDURE — 25510000001 IOPAMIDOL 61 % SOLUTION: Performed by: INTERNAL MEDICINE

## 2025-06-04 PROCEDURE — 25510000002 DIATRIZOATE MEGLUMINE & SODIUM PER 1 ML: Performed by: INTERNAL MEDICINE

## 2025-06-04 PROCEDURE — 74177 CT ABD & PELVIS W/CONTRAST: CPT

## 2025-06-04 RX ORDER — IOPAMIDOL 612 MG/ML
100 INJECTION, SOLUTION INTRAVASCULAR
Status: COMPLETED | OUTPATIENT
Start: 2025-06-04 | End: 2025-06-04

## 2025-06-04 RX ORDER — DIATRIZOATE MEGLUMINE AND DIATRIZOATE SODIUM 660; 100 MG/ML; MG/ML
30 SOLUTION ORAL; RECTAL
Status: COMPLETED | OUTPATIENT
Start: 2025-06-04 | End: 2025-06-04

## 2025-06-04 RX ADMIN — IOPAMIDOL 85 ML: 612 INJECTION, SOLUTION INTRAVENOUS at 10:05

## 2025-06-04 RX ADMIN — DIATRIZOATE MEGLUMINE AND DIATRIZOATE SODIUM 30 ML: 660; 100 LIQUID ORAL; RECTAL at 08:40

## 2025-06-25 ENCOUNTER — OFFICE VISIT (OUTPATIENT)
Dept: ONCOLOGY | Facility: CLINIC | Age: 60
End: 2025-06-25
Payer: COMMERCIAL

## 2025-06-25 ENCOUNTER — LAB (OUTPATIENT)
Dept: OTHER | Facility: HOSPITAL | Age: 60
End: 2025-06-25
Payer: COMMERCIAL

## 2025-06-25 VITALS
DIASTOLIC BLOOD PRESSURE: 70 MMHG | OXYGEN SATURATION: 96 % | WEIGHT: 168.8 LBS | BODY MASS INDEX: 29.91 KG/M2 | RESPIRATION RATE: 17 BRPM | HEART RATE: 80 BPM | TEMPERATURE: 97.8 F | HEIGHT: 63 IN | SYSTOLIC BLOOD PRESSURE: 116 MMHG

## 2025-06-25 DIAGNOSIS — D72.820 LYMPHOCYTOSIS: ICD-10-CM

## 2025-06-25 DIAGNOSIS — K57.92 ACUTE DIVERTICULITIS: ICD-10-CM

## 2025-06-25 DIAGNOSIS — R11.0 INTRACTABLE NAUSEA: ICD-10-CM

## 2025-06-25 DIAGNOSIS — D72.828 NEUTROPHILIA: ICD-10-CM

## 2025-06-25 DIAGNOSIS — D75.839 THROMBOCYTOSIS: ICD-10-CM

## 2025-06-25 DIAGNOSIS — D75.89 MACROCYTOSIS: ICD-10-CM

## 2025-06-25 DIAGNOSIS — D72.820 LYMPHOCYTOSIS: Primary | ICD-10-CM

## 2025-06-25 DIAGNOSIS — R53.82 CHRONIC FATIGUE: ICD-10-CM

## 2025-06-25 LAB
B2 MICROGLOB SERPL-MCNC: 3.1 MG/L (ref 0.8–2.2)
BASOPHILS # BLD AUTO: 0.06 10*3/MM3 (ref 0–0.2)
BASOPHILS NFR BLD AUTO: 0.6 % (ref 0–1.5)
DEPRECATED RDW RBC AUTO: 46.6 FL (ref 37–54)
EOSINOPHIL # BLD AUTO: 0.14 10*3/MM3 (ref 0–0.4)
EOSINOPHIL NFR BLD AUTO: 1.3 % (ref 0.3–6.2)
ERYTHROCYTE [DISTWIDTH] IN BLOOD BY AUTOMATED COUNT: 12.5 % (ref 12.3–15.4)
HCT VFR BLD AUTO: 47.5 % (ref 34–46.6)
HGB BLD-MCNC: 15.4 G/DL (ref 12–15.9)
IMM GRANULOCYTES # BLD AUTO: 0.06 10*3/MM3 (ref 0–0.05)
IMM GRANULOCYTES NFR BLD AUTO: 0.6 % (ref 0–0.5)
LYMPHOCYTES # BLD AUTO: 1.32 10*3/MM3 (ref 0.7–3.1)
LYMPHOCYTES NFR BLD AUTO: 12.6 % (ref 19.6–45.3)
MCH RBC QN AUTO: 32.9 PG (ref 26.6–33)
MCHC RBC AUTO-ENTMCNC: 32.4 G/DL (ref 31.5–35.7)
MCV RBC AUTO: 101.5 FL (ref 79–97)
MONOCYTES # BLD AUTO: 0.6 10*3/MM3 (ref 0.1–0.9)
MONOCYTES NFR BLD AUTO: 5.7 % (ref 5–12)
NEUTROPHILS NFR BLD AUTO: 79.2 % (ref 42.7–76)
NEUTROPHILS NFR BLD AUTO: 8.3 10*3/MM3 (ref 1.7–7)
NRBC BLD AUTO-RTO: 0 /100 WBC (ref 0–0.2)
PLATELET # BLD AUTO: 444 10*3/MM3 (ref 140–450)
PMV BLD AUTO: 8.7 FL (ref 6–12)
RBC # BLD AUTO: 4.68 10*6/MM3 (ref 3.77–5.28)
WBC NRBC COR # BLD AUTO: 10.48 10*3/MM3 (ref 3.4–10.8)

## 2025-06-25 PROCEDURE — 82232 ASSAY OF BETA-2 PROTEIN: CPT | Performed by: INTERNAL MEDICINE

## 2025-06-25 PROCEDURE — 85025 COMPLETE CBC W/AUTO DIFF WBC: CPT | Performed by: INTERNAL MEDICINE

## 2025-06-25 PROCEDURE — 36415 COLL VENOUS BLD VENIPUNCTURE: CPT

## 2025-06-25 PROCEDURE — 99214 OFFICE O/P EST MOD 30 MIN: CPT | Performed by: INTERNAL MEDICINE

## 2025-06-25 RX ORDER — METRONIDAZOLE 500 MG/1
500 TABLET ORAL 3 TIMES DAILY
Qty: 21 TABLET | Refills: 0 | Status: SHIPPED | OUTPATIENT
Start: 2025-06-25 | End: 2025-07-02

## 2025-06-25 RX ORDER — CIPROFLOXACIN 500 MG/1
500 TABLET, FILM COATED ORAL 2 TIMES DAILY
Qty: 14 TABLET | Refills: 0 | Status: SHIPPED | OUTPATIENT
Start: 2025-06-25 | End: 2025-07-02

## 2025-06-25 NOTE — PROGRESS NOTES
"Subjective     CHIEF COMPLAINT:      Chief Complaint   Patient presents with    Follow-up     HISTORY OF PRESENT ILLNESS:     Ingrid London is a 59 y.o. female patient who returns today for follow up on her monoclonal B-cell lymphocytosis.  She returns today for follow-up reporting that she developed abdominal cramping earlier today.  No fever or chills.      ROS:  Pertinent ROS is in the HPI.     Past medical, surgical, social and family history were reviewed.     MEDICATIONS:    Current Outpatient Medications:     Erenumab-aooe (Aimovig) 140 MG/ML auto-injector, Inject 1 mL under the skin into the appropriate area as directed., Disp: , Rfl:     folic acid (FOLVITE) 1 MG tablet, Take 1 tablet by mouth Daily., Disp: 90 tablet, Rfl: 1    galcanezumab-gnlm (Emgality) 120 MG/ML auto-injector pen, Inject 1 mL under the skin into the appropriate area as directed 1 (One) Time., Disp: , Rfl:     levothyroxine (SYNTHROID, LEVOTHROID) 100 MCG tablet, Take 1 tablet by mouth Daily., Disp: 30 tablet, Rfl: 0    Mucinex D  MG per 12 hr tablet, TAKE ONE TABLET BY MOUTH DAILY, Disp: 90 tablet, Rfl: 0    traZODone (DESYREL) 50 MG tablet, Take 1 tablet by mouth Every Night., Disp: , Rfl:   Objective     VITAL SIGNS:     Vitals:    06/25/25 1245   BP: 116/70   Pulse: 80   Resp: 17   Temp: 97.8 °F (36.6 °C)   TempSrc: Oral   SpO2: 96%   Weight: 76.6 kg (168 lb 12.8 oz)   Height: 160 cm (62.99\")   PainSc: 0-No pain     Body mass index is 29.91 kg/m².     Wt Readings from Last 5 Encounters:   06/25/25 76.6 kg (168 lb 12.8 oz)   05/21/25 75.6 kg (166 lb 9.6 oz)   03/28/24 109 kg (239 lb 3.2 oz)     PHYSICAL EXAMINATION:   GENERAL: The patient appears in good general condition, not in acute distress.   SKIN: No Ecchymosis.  EYES: No jaundice. No Pallor.  CHEST: Normal respiratory effort. Normal breathing sounds bilaterally. No added sounds.  CVS: Normal S1 and S2. No Murmur.  ABDOMEN: Soft.  Epigastric tenderness. No Hepatomegaly. No " Splenomegaly. No masses.  EXTREMITIES: No joint deformity.     DIAGNOSTIC DATA:     Results from last 7 days   Lab Units 06/25/25  1236   WBC 10*3/mm3 10.48   NEUTROS ABS 10*3/mm3 8.30*   HEMOGLOBIN g/dL 15.4   HEMATOCRIT % 47.5*   PLATELETS 10*3/mm3 444     Component      Latest Ref Rng 5/21/2025   Beta-2 Microglobulin      0.8 - 2.2 mg/L 2.9 (H)       Component      Latest Ref Rng 5/21/2025   IgG      586 - 1602 mg/dL 552 (L)    IgA      87 - 352 mg/dL 37 (L)    IgM      26 - 217 mg/dL 21 (L)    Total Protein      6.0 - 8.5 g/dL 6.2    Albumin      2.9 - 4.4 g/dL 3.6    Alpha-1-Globulin      0.0 - 0.4 g/dL 0.3    Alpha-2-Globulin      0.4 - 1.0 g/dL 0.9    Beta Globulin      0.7 - 1.3 g/dL 0.9    Gamma Globulin      0.4 - 1.8 g/dL 0.5    M-David      Not Observed g/dL Not Observed    Globulin      2.2 - 3.9 g/dL 2.6    A/G Ratio      0.7 - 1.7  1.4    Immunofixation Reflex, Serum Comment    Please note Comment    Kappa FLC      3.3 - 19.4 mg/L 11.8    Free Lambda Light Chains      5.7 - 26.3 mg/L 10.0    Kappa/Lambda Ratio      0.26 - 1.65  1.18       Component      Latest Ref Rng 5/21/2025 6/25/2025   Beta-2 Microglobulin      0.8 - 2.2 mg/L 2.9 (H)  3.1 (H)       CT abdomen pelvis on 6/4/2025:  Lung bases: Subsegmental atelectasis at the bases.     ABDOMEN: Few low attenuating liver lesions measuring less than 1 cm are  too small to characterize, often small biliary cystic hamartomas. Small  cyst seen in segment 8 of the right hepatic lobe. Cholecystectomy. No  biliary ductal dilatation. Spleen is normal in size. No pancreatic mass  or pancreatic ductal dilatation seen. No adrenal nodules. Small cyst  seen in the left mid kidney. No solid-appearing renal mass or  hydronephrosis.     Pelvis: Normal bladder. No bladder calculus. Hysterectomy. No adnexal  mass.     Bowel: No small bowel obstruction. Moderate gas and stool in the rectum.  Colonic diverticulosis. Normal appendix.     Abdominal wall: Small  fat-containing umbilical hernia. Pelvic wall  scarring.     Retroperitoneum: No lymphadenopathy.     Vasculature: Patent. No abdominal aortic aneurysm.     Osseous structures: No destructive osseous lesions. Spondylosis at  L1/L2.     IMPRESSION:     1. No lymphadenopathy or splenomegaly. No acute findings identified in  the abdomen or pelvis.  2. Colonic diverticulosis.    Assessment & Plan    *Monoclonal B-cell lymphocytosis.  Flow cytometry on 4/7/2025 revealed atypical kappa light chain predominant B-cell population representing 5.6% of the cells.  The features were non-CLL like.  Total lymphocyte count on 4/7/2025 was 2220.  5/21/2025: Lymphocyte count 1620.  Exam revealed no palpable lymphadenopathy or splenomegaly.  ?  Secondary to infections she developed in January 2025-norovirus, astrovirus and E. Coli.  She was found to have low IgG IgM and IgA levels-?  Reactive decrease in the levels secondary to the infections in January 2025.  5/21/2025: IgG 552.  IgA 37.  IgM 21.  No M protein.  Kappa FLC 11.8.  Lambda FLC 10.0.  Kappa/lambda ratio 1.18.    Beta-2 microglobulin elevated at 2.9.  CT on 6/4/2025 revealed no evidence of lymphadenopathy or splenomegaly.  6/25/2025: Lymphocyte count decreased to 1320.  Beta-2 microglobulin increased to 3.1.  This is likely secondary to the infection-diverticulitis.  I reassured the patient that the improvement in the lymphocyte count indicates that the monoclonal B-cell lymphocytosis did not transform into a malignant process.    *Macrocytosis.  5/21/2025: .  Hemoglobin 15.4.  Hematocrit 46.5.  Patient reports significant fatigue and lethargy.  She does not have underlying liver disease.  LFTs are normal.  She does not drink alcohol regularly.  She has hypothyroidism and is on replacement.  Labs today revealed vitamin B12 >2000.  Folate is low normal at 5.92.  She was started on folic acid 1 mg daily.  6/25/2025: MCV remained elevated at  101.5.    *Thrombocytosis.  4/7/2025: Platelet count increased to 456,000.  5/21/2025: Platelet count improved to 404,000.  6/25/2025: Platelet count 444,000.    *Neutrophilia.  CT on 6/4/2025 revealed changes of diverticulosis.  6/25/2025: Neutrophil count increased to 8300.  She complained of epigastric pain.  Exam revealed tenderness in the area.  ?  Diverticulitis    *Nausea.  Reports having nausea almost every other day.  She was found to have Conteh's esophagus on EGD in April 2025.  She is on pantoprazole 40 mg daily.    *History of melanoma.  She has history of melanoma involving the left forearm.  S/p mild local excision + axillary sentinel lymph node biopsy by Dr. Radha Villegas.  She did not require adjuvant therapy.    PLAN:    1.  I recommended treatment with Flagyl 500 mg 3 times a day and Cipro 500 mg twice a day x 7 days.  2.  Continue folic acid 1 mg daily.  3.  Follow-up in 4 months.  1 week before the visit, will obtain CBC SPEP ODILON FLC B2M and folate levels.          Savannah Brady MD  06/25/25